# Patient Record
Sex: MALE | Race: WHITE
[De-identification: names, ages, dates, MRNs, and addresses within clinical notes are randomized per-mention and may not be internally consistent; named-entity substitution may affect disease eponyms.]

---

## 2020-01-01 ENCOUNTER — HOSPITAL ENCOUNTER (EMERGENCY)
Dept: HOSPITAL 74 - FER | Age: 45
Discharge: HOME | End: 2020-01-01
Payer: COMMERCIAL

## 2020-01-01 VITALS — HEART RATE: 74 BPM | DIASTOLIC BLOOD PRESSURE: 87 MMHG | SYSTOLIC BLOOD PRESSURE: 140 MMHG | TEMPERATURE: 98.3 F

## 2020-01-01 VITALS — BODY MASS INDEX: 26.6 KG/M2

## 2020-01-01 DIAGNOSIS — K08.89: Primary | ICD-10-CM

## 2020-01-01 NOTE — PDOC
Documentation entered by Chico Ramey SCRIBE, acting as scribe for Wesley Thornton MD.








Wesley Thornton MD:  This documentation has been prepared by the Karoline holt Elijah, SCRIBE, under my direction and personally reviewed by me in its 

entirety.  I confirm that the documentation accurately reflects all work, 

treatment, procedures, and medical decision making performed by me.  





History of Present Illness





- General


Chief Complaint: Pain


Stated Complaint: DENTAL PAIN RIGHT SIDE


History Source: Patient


Exam Limitations: No Limitations





- History of Present Illness


Initial Comments: 





01/01/20 16:22


Patient is a 44 year old male with no reported past medical history who 

presents today with dental pain beginning x4 days ago. Patient reports that on 

Saturday his symptoms onset and as they worsened he was seen x2 days later by 

Dr. Ana Lilia Gasca for a Root Canal. At this time, the patient reports the pain as 

intolerable, starting in the jaw and radiating to the temple, and throbbing in 

nature.


Allergies: NKA


Dentist: Dr. Ana Lilia Gasca








Past History





- Past Medical History


Allergies/Adverse Reactions: 


 Allergies











Allergy/AdvReac Type Severity Reaction Status Date / Time


 


No Known Allergies Allergy   Unverified 01/01/20 15:55











Home Medications: 


Ambulatory Orders





Clindamycin [Cleocin -] 300 mg PO Q6HPO #28 capsule 01/01/20 


Oxycodone HCl/Acetaminophen [Percocet  mg Tablet] 1 each PO Q4H PRN #14 

tablet MDD 6 01/01/20 











**Review of Systems





- Review of Systems


Comments:: 





01/01/20 16:26


CONSTITUTIONAL:


Absent: Fever, Chills, Diaphoresis, Generalized Weakness, Malaise, Loss of 

Appetite


HEENT:


Absent: Rhinorrhea, Nasal Congestion, Throat Pain, Throat Swelling, Difficulty 

Swallowing, Mouth Swelling, Ear Pain, Eye Pain, Visual Changes


CARDIOVASCULAR:


Absent: Chest Pain, Syncope, Palpitations, Irregular Heart Rate, Lightheadedness

, Peripheral Edema


RESPIRATORY:


Absent: Cough, Shortness of Breath, SOB with Exertion, Orthopnea, Wheezing, 

Stridor, Hemoptysis


GASTROINTESTINAL:


Absent: Abdominal pain, Abdominal Distension, Nausea, Vomiting, Diarrhea, 

Constipation, Melena, Hematochezia


GENITOURINARY:


Absent: Dysuria, Frequency, Urgency, Hesitancy, Flank Pain, Genital Pain


MUSCULOSKELETAL:


Absent: Myalgia, Arthralgia, Joint Swelling, Back pain, Neck Pain


SKIN:


Absent: Rash, Itching, Pallor


HEMEATOLOGIC/IMMUNOLOGIC:


Absent: Easy Bleeding, Easy Bruising, Lymphadenopathy, Frequent infections


ENDOCRINE:


Absent: Unexplained Weight Gain, Unexplained Weight Loss, Heat Intolerance, 

Cold Intolerance


NEUROLOGIC:


Absent: Headache, Focal Weakness, Paresthesias, Vertigo, Lightheadedness, 

Unsteady Gait, Seizure, Mental Status Changes, Incontinence


PSYCHIATRIC:


Absent: Anxiety, Depression














*Physical Exam





- Vital Signs


 Last Vital Signs











Temp Pulse Resp BP Pulse Ox


 


 98.3 F   74   16   140/87   100 


 


 01/01/20 15:49  01/01/20 15:49  01/01/20 15:49  01/01/20 15:49  01/01/20 15:49














- Physical Exam





01/01/20 16:39


GENERAL: The patient is awake, alert, and fully oriented, in no acute distress.

  He states he has severe nerve pain in the right lower quadrant of his mouth 

where he had a root canal performed 2 days ago.


HEAD: Normal with no signs of trauma.  No facial swelling.


EYES: Pupils equal, round and reactive to light, extraocular movements intact, 

sclera anicteric, conjunctiva clear.


MOUTH: There is no trismus.  The molar in the right lower quadrant has a 

temporary filling where the root canal was performed.  That tooth has tap 

tenderness.  There is no adjacent gum swelling or fluctuance.


NECK: There is no adenopathy or neck swelling.  The floor of the mouth is soft.


EXTREMITIES: Normal range of motion, no edema.


NEUROLOGICAL: Normal speech, normal gait.


PSYCH: Normal mood, normal affect.


SKIN: Warm, Dry, normal turgor, no rashes or lesions noted.





ED Treatment Course





- Medications


Given in the ED: 


ED Medications














Discontinued Medications














Generic Name Dose Route Start Last Admin





  Trade Name Freq  PRN Reason Stop Dose Admin


 


Clindamycin HCl  300 mg  01/01/20 16:24  01/01/20 16:29





  Cleocin -  PO  01/01/20 16:25  300 mg





  ONCE ONE   Administration





     





     





     





     


 


Oxycodone/Acetaminophen  2 combo  01/01/20 16:24  01/01/20 16:29





  Percocet 5/325 -  PO  01/01/20 16:25  2 combo





  ONCE ONE   Administration





     





     





     





     














Medical Decision Making





- Medical Decision Making





01/01/20 16:41


Patient is a 44-year-old man who developed pulpitis and required a root canal 

on his right lower quadrant second molar.  The onset of pain was on December 28 

and he had the root canal performed on December 30.  He was placed on Augmentin 

875 twice daily.  He comes in now with intractable pain, not responsive to high-

dose ibuprofen.





On examination, there is no fluctuance or swelling of the gum.  There is 

positive tap tenderness to the tooth.  There is no trismus, and there is no 

facial swelling.  There is no neck swelling.





I discussed these findings with Dr. Ana Lilia Gasca, his dentist.  I was advised to 

switch his antibiotics to clindamycin, to increase his analgesia, and to refer 

him back to Dr. Gasca for reevaluation in 48 hours on Friday.





Patient low risk for opiates based on rapid questionnaire.  He will be treated 

with clindamycin and Percocet with close follow-up.





Discharge





- Discharge Information


Problems reviewed: Yes


Clinical Impression/Diagnosis: 


 Toothache





Condition: Improved


Disposition: HOME





- Admission


No





- Additional Discharge Information


Prescriptions: 


Clindamycin [Cleocin -] 300 mg PO Q6HPO #28 capsule


Oxycodone HCl/Acetaminophen [Percocet  mg Tablet] 1 each PO Q4H PRN #14 

tablet MDD 6


 PRN Reason: Severe Pain


Prescription Drug Monitoring Program (I-STOP) results: I-STOP reviewed and no 

issues identified





- Follow up/Referral





- Patient Discharge Instructions


Patient Printed Discharge Instructions:  [facility] for Prescription Opioid Use, [facility] for 

Dental Pain


Additional Instructions: 


Today you were evaluated for a toothache in the area of your recent root canal.

  





We are changing your antibiotics.  Stop the Augmentin.  Start clindamycin 300 

mg every 6 hours.  





Take Percocet 10/325, 1 tablet every 4 hours as needed for severe pain.  You 

may also take Naprosyn 500 mg twice daily.  These 2 medications will not 

interfere with one another.  Stop taking ibuprofen in excess of doses.  The 

Percocet can be addictive, you should take the minimal necessary dose and stop 

as soon as the pain improves.





Dr. Ana Lilia Gasca is expecting to see you for follow-up on Friday, in 48 hours.  

Please call the office tomorrow to schedule your appointment time for Friday.





Return to the emergency department for any severe or progressive symptoms such 

as fever, facial swelling, difficulty swallowing, or difficulty breathing.





- Post Discharge Activity

## 2021-02-04 PROBLEM — Z00.00 ENCOUNTER FOR PREVENTIVE HEALTH EXAMINATION: Status: ACTIVE | Noted: 2021-02-04

## 2024-08-19 ENCOUNTER — RESULT REVIEW (OUTPATIENT)
Age: 49
End: 2024-08-19